# Patient Record
Sex: FEMALE | Race: WHITE | Employment: FULL TIME | URBAN - NONMETROPOLITAN AREA
[De-identification: names, ages, dates, MRNs, and addresses within clinical notes are randomized per-mention and may not be internally consistent; named-entity substitution may affect disease eponyms.]

---

## 2020-01-23 ENCOUNTER — OFFICE VISIT (OUTPATIENT)
Dept: FAMILY MEDICINE CLINIC | Age: 35
End: 2020-01-23
Payer: COMMERCIAL

## 2020-01-23 VITALS
SYSTOLIC BLOOD PRESSURE: 138 MMHG | RESPIRATION RATE: 18 BRPM | OXYGEN SATURATION: 98 % | HEIGHT: 60 IN | BODY MASS INDEX: 32.39 KG/M2 | WEIGHT: 165 LBS | HEART RATE: 107 BPM | TEMPERATURE: 98.7 F | DIASTOLIC BLOOD PRESSURE: 86 MMHG

## 2020-01-23 LAB
INFLUENZA A ANTIBODY: NORMAL
INFLUENZA B ANTIBODY: NORMAL

## 2020-01-23 PROCEDURE — 99213 OFFICE O/P EST LOW 20 MIN: CPT | Performed by: NURSE PRACTITIONER

## 2020-01-23 PROCEDURE — 87804 INFLUENZA ASSAY W/OPTIC: CPT | Performed by: NURSE PRACTITIONER

## 2020-01-23 RX ORDER — OSELTAMIVIR PHOSPHATE 75 MG/1
75 CAPSULE ORAL 2 TIMES DAILY
Qty: 10 CAPSULE | Refills: 0 | Status: SHIPPED | OUTPATIENT
Start: 2020-01-23 | End: 2020-01-28

## 2020-01-23 RX ORDER — BROMPHENIRAMINE MALEATE, PSEUDOEPHEDRINE HYDROCHLORIDE, AND DEXTROMETHORPHAN HYDROBROMIDE 2; 30; 10 MG/5ML; MG/5ML; MG/5ML
10 SYRUP ORAL 3 TIMES DAILY PRN
Qty: 200 ML | Refills: 0 | Status: SHIPPED
Start: 2020-01-23 | End: 2020-02-25 | Stop reason: ALTCHOICE

## 2020-01-23 NOTE — PROGRESS NOTES
Subjective:  Chief Complaint   Patient presents with    Fever    Generalized Body Aches       HPI:  The patient states that they have had a cough, runny nose and nasal congestion for the last 2 days. Cough is productive of sputum. The patient also reports mild sore throat without pain with swallowing/difficulty swallowing. Reports fever 102.5. Patient denies CP or dyspnea. No vomiting or diarrhea. Patient has been taking OTC medications without improvement. The patient presents for evaluation. ROS:  Positive and pertinent negatives as per HPI. All other systems are reviewed and negative. Current Outpatient Medications:     oseltamivir (TAMIFLU) 75 MG capsule, Take 1 capsule by mouth 2 times daily for 5 days, Disp: 10 capsule, Rfl: 0    brompheniramine-pseudoephedrine-DM (BROMFED DM) 2-30-10 MG/5ML syrup, Take 10 mLs by mouth 3 times daily as needed for Congestion or Cough, Disp: 200 mL, Rfl: 0   Allergies   Allergen Reactions    Penicillins     Polymyxin B-Trimethoprim         Objective:  Vitals:    01/23/20 1340   BP: 138/86   Pulse: 107   Resp: 18   Temp: 98.7 °F (37.1 °C)   TempSrc: Temporal   SpO2: 98%   Weight: 165 lb (74.8 kg)   Height: 5' (1.524 m)        Exam:  Const: Appears healthy and well developed. No signs of acute distress present. Vitals reviewed per triage. Head/Face: Normocephalic, atraumatic. Facies is symmetric. Eyes: PERRL. ENMT: Tympanic membranes are pearly gray with good light reflex bilaterally. Nares are patent with clear rhinorrhea. Buccal mucosa is moist. Mild erythema in the posterior pharynx without edema of oropharynx or petechiae of palate. Neck: Supple and symmetric. Palpation reveals no adenopathy. No meningeal signs. Trachea midline. Resp: Lungs are clear to auscultation bilaterally without wheezes, rhonchi, or crackles. Chest expansion was symmetrical without accessory muscle use noted. Cough present.   CV: S1 is normal. S2 is normal.  Musculo: Patient moves extremities without pain or limitation. Pulses are equal bilaterally. Skin: Skin is warm and dry. Neuro: Alert and oriented x3. Speech is articulate and fluent. Psych: Mood and affect are appropriate to situation. Suspicious for flu even though rapid flu was negative. I will treat with Tamiflu. She denies any chance of pregnancy. Bromfed is also given. Maintain hydration, over-the-counter antipyretics for fever. Any worsening she will need reevaluated. Hraper Kirkpatrick was seen today for fever and generalized body aches. Diagnoses and all orders for this visit:    Body aches  -     POCT Influenza A/B    Acute upper respiratory infection  -     oseltamivir (TAMIFLU) 75 MG capsule; Take 1 capsule by mouth 2 times daily for 5 days  -     brompheniramine-pseudoephedrine-DM (BROMFED DM) 2-30-10 MG/5ML syrup;  Take 10 mLs by mouth 3 times daily as needed for Congestion or Cough           Seen By:    MONICA Patel - CNP

## 2020-02-25 ENCOUNTER — OFFICE VISIT (OUTPATIENT)
Dept: FAMILY MEDICINE CLINIC | Age: 35
End: 2020-02-25
Payer: COMMERCIAL

## 2020-02-25 VITALS
DIASTOLIC BLOOD PRESSURE: 85 MMHG | HEART RATE: 104 BPM | BODY MASS INDEX: 32.98 KG/M2 | SYSTOLIC BLOOD PRESSURE: 125 MMHG | TEMPERATURE: 98.8 F | WEIGHT: 168 LBS | HEIGHT: 60 IN | OXYGEN SATURATION: 97 %

## 2020-02-25 LAB
INFLUENZA A ANTIBODY: NORMAL
INFLUENZA B ANTIBODY: NORMAL

## 2020-02-25 PROCEDURE — 99213 OFFICE O/P EST LOW 20 MIN: CPT | Performed by: NURSE PRACTITIONER

## 2020-02-25 PROCEDURE — 87804 INFLUENZA ASSAY W/OPTIC: CPT | Performed by: NURSE PRACTITIONER

## 2020-02-25 RX ORDER — OSELTAMIVIR PHOSPHATE 75 MG/1
75 CAPSULE ORAL 2 TIMES DAILY
Qty: 10 CAPSULE | Refills: 0 | Status: SHIPPED | OUTPATIENT
Start: 2020-02-25 | End: 2020-03-01

## 2020-02-25 NOTE — PROGRESS NOTES
Chief Complaint   Fever and Generalized Body Aches      History of Present Illness   Source of history provided by:  patient. Stephan Fuentes is a 38 y.o. old female who has a past medical history of: No past medical history on file. Presents to the [facility] care with complaints of a nonproductive cough, fever, body aches, chills, mild nausea, sore throat, and nasal congestion/drainage. States symptoms have been present x a couple hours. States symptoms have progressed since onset. Denies any CP, SOB, abdominal pain, vomiting, diarrhea, rash, or lethargy. Has been taking nothing for the symptoms. Denies any hx of asthma, COPD, or tobacco use. She did not receive her influenza vaccination this season. Review of Systems   Unless otherwise stated in this report or unable to obtain because of the patient's clinical or mental status as evidenced by the medical record, this patients's positive and negative responses for Review of Systems, constitutional, psych, eyes, ENT, cardiovascular, respiratory, gastrointestinal, neurological, genitourinary, musculoskeletal, integument systems and systems related to the presenting problem are either stated in the preceding or were negative for the symptoms and/or complaints related to the medical problem. Surgical History:No past surgical history on file. Social History:  reports that she has never smoked. She has never used smokeless tobacco.  Family History: family history is not on file. Allergies: Penicillins and Polymyxin b-trimethoprim    Physical Exam    VS:  /85   Pulse 104   Temp 98.8 °F (37.1 °C) (Temporal)   Ht 5' (1.524 m)   Wt 168 lb (76.2 kg)   SpO2 97%   BMI 32.81 kg/m²    Oxygen Saturation Interpretation: Normal.    Constitutional:  Alert, development consistent with age. NAD. Head:  NC/NT. Airway patent. Ears: TMs are bilaterally. Canals without exudate or swelling bilaterally.   Nose: Mild congestion of the nasal mucosa with mild drainage. Mouth: Posterior pharynx with mild erythema and clear postnasal drip. There is no tonsillar hypertrophy or exudate. Neck:  Normal ROM. Supple. There is no anterior cervical adenopathy noted. Lungs: CTAB without wheezes, rales, or rhonchi. CV:  Regular rhythm, tachycardic, normal heart sounds, without pathological murmurs, ectopy, gallops, or rubs. Skin:  Normal turgor. Warm, dry, without visible rash. Lymphatic: No lymphangitis or adenopathy noted. Neurological:  Oriented. Motor functions intact. Test Results Section   (All laboratory and radiology results have been personally reviewed by myself)  Labs:  Results for orders placed or performed in visit on 02/25/20   POCT Influenza A/B   Result Value Ref Range    Influenza A Ab neg     Influenza B Ab neg      Imaging: All Radiology results interpreted by Radiologist unless otherwise noted. No results found. Assessment Julio C Dougherty was seen today for fever and generalized body aches. Diagnoses and all orders for this visit:    Flu-like symptoms  -     oseltamivir (TAMIFLU) 75 MG capsule; Take 1 capsule by mouth 2 times daily for 5 days  - Tylenol/ibuprofen as needed for pain or fever    Body aches  -     POCT Influenza A/B    Rapid influenza test negative in office today, however clinically patient presents with flulike symptoms therefore will treat patient. Script written for Tamiflu, side effects discussed. Increase fluids and rest. NSAIDs prn body aches/fever. PCP in 3-5 days for recheck if symptoms persist. ED sooner if symptoms worsen or change. ED immediately with high or refractory fever, SOB, CP, shaking chills, vomiting, abdominal pain, etc. Pt is in agreement with this care plan. All questions answered. Return if symptoms worsen or fail to improve.     Jose Marc, APRN - CNP

## 2020-02-27 ENCOUNTER — OFFICE VISIT (OUTPATIENT)
Dept: FAMILY MEDICINE CLINIC | Age: 35
End: 2020-02-27
Payer: COMMERCIAL

## 2020-02-27 VITALS
DIASTOLIC BLOOD PRESSURE: 85 MMHG | OXYGEN SATURATION: 98 % | SYSTOLIC BLOOD PRESSURE: 125 MMHG | WEIGHT: 168 LBS | HEART RATE: 106 BPM | TEMPERATURE: 97.7 F | HEIGHT: 60 IN | BODY MASS INDEX: 32.98 KG/M2

## 2020-02-27 PROCEDURE — 99213 OFFICE O/P EST LOW 20 MIN: CPT | Performed by: PHYSICIAN ASSISTANT

## 2020-02-27 NOTE — LETTER
[facility]  [address] [place] [address]  Phone: [phone]  Fax: [phone] [place], [place]        February 27, 2020     Patient: Fernando Bright   YOB: 1985   Date of Visit: 2/27/2020       To Whom it May Concern:    Fernando Bright was seen in my clinic on 2/27/2020. She may return to work on 03/02/2020. If you have any questions or concerns, please don't hesitate to call.     Sincerely,         VERENICE Mendoza

## 2020-06-18 ENCOUNTER — APPOINTMENT (OUTPATIENT)
Dept: GENERAL RADIOLOGY | Age: 35
End: 2020-06-18
Payer: COMMERCIAL

## 2020-06-18 ENCOUNTER — HOSPITAL ENCOUNTER (EMERGENCY)
Age: 35
Discharge: HOME | End: 2020-06-18
Attending: FAMILY MEDICINE
Payer: COMMERCIAL

## 2020-06-18 VITALS
TEMPERATURE: 98.4 F | BODY MASS INDEX: 30.63 KG/M2 | HEART RATE: 68 BPM | SYSTOLIC BLOOD PRESSURE: 106 MMHG | RESPIRATION RATE: 16 BRPM | WEIGHT: 156 LBS | DIASTOLIC BLOOD PRESSURE: 70 MMHG | HEIGHT: 60 IN | OXYGEN SATURATION: 98 %

## 2020-06-18 PROCEDURE — 90715 TDAP VACCINE 7 YRS/> IM: CPT | Performed by: FAMILY MEDICINE

## 2020-06-18 PROCEDURE — 6370000000 HC RX 637 (ALT 250 FOR IP): Performed by: FAMILY MEDICINE

## 2020-06-18 PROCEDURE — 6360000002 HC RX W HCPCS: Performed by: FAMILY MEDICINE

## 2020-06-18 PROCEDURE — 99283 EMERGENCY DEPT VISIT LOW MDM: CPT

## 2020-06-18 PROCEDURE — 73564 X-RAY EXAM KNEE 4 OR MORE: CPT

## 2020-06-18 PROCEDURE — 90471 IMMUNIZATION ADMIN: CPT | Performed by: FAMILY MEDICINE

## 2020-06-18 RX ORDER — IBUPROFEN 600 MG/1
600 TABLET ORAL ONCE
Status: COMPLETED | OUTPATIENT
Start: 2020-06-18 | End: 2020-06-18

## 2020-06-18 RX ORDER — ACETAMINOPHEN 500 MG
1000 TABLET ORAL EVERY 8 HOURS PRN
Qty: 50 TABLET | Refills: 0 | Status: SHIPPED | OUTPATIENT
Start: 2020-06-18

## 2020-06-18 RX ORDER — IBUPROFEN 400 MG/1
400 TABLET ORAL EVERY 8 HOURS PRN
Qty: 12 TABLET | Refills: 0 | Status: SHIPPED | OUTPATIENT
Start: 2020-06-18

## 2020-06-18 RX ORDER — ACETAMINOPHEN 500 MG
1000 TABLET ORAL ONCE
Status: COMPLETED | OUTPATIENT
Start: 2020-06-18 | End: 2020-06-18

## 2020-06-18 RX ADMIN — TETANUS TOXOID, REDUCED DIPHTHERIA TOXOID AND ACELLULAR PERTUSSIS VACCINE, ADSORBED 0.5 ML: 5; 2.5; 8; 8; 2.5 SUSPENSION INTRAMUSCULAR at 09:37

## 2020-06-18 RX ADMIN — IBUPROFEN 600 MG: 600 TABLET, FILM COATED ORAL at 09:35

## 2020-06-18 RX ADMIN — ACETAMINOPHEN 1000 MG: 500 TABLET ORAL at 09:34

## 2020-06-18 ASSESSMENT — PAIN - FUNCTIONAL ASSESSMENT: PAIN_FUNCTIONAL_ASSESSMENT: PREVENTS OR INTERFERES SOME ACTIVE ACTIVITIES AND ADLS

## 2020-06-18 ASSESSMENT — PAIN DESCRIPTION - ORIENTATION: ORIENTATION: RIGHT

## 2020-06-18 ASSESSMENT — PAIN SCALES - WONG BAKER: WONGBAKER_NUMERICALRESPONSE: 0

## 2020-06-18 ASSESSMENT — PAIN DESCRIPTION - FREQUENCY: FREQUENCY: CONTINUOUS

## 2020-06-18 ASSESSMENT — PAIN DESCRIPTION - LOCATION: LOCATION: KNEE

## 2020-06-18 ASSESSMENT — PAIN DESCRIPTION - DESCRIPTORS: DESCRIPTORS: DISCOMFORT

## 2020-06-18 ASSESSMENT — PAIN SCALES - GENERAL
PAINLEVEL_OUTOF10: 6

## 2020-06-18 ASSESSMENT — PAIN DESCRIPTION - ONSET: ONSET: ON-GOING

## 2020-06-18 ASSESSMENT — PAIN DESCRIPTION - PAIN TYPE: TYPE: ACUTE PAIN

## 2020-07-09 ENCOUNTER — HOSPITAL ENCOUNTER (OUTPATIENT)
Dept: MRI IMAGING | Age: 35
Discharge: HOME | End: 2020-07-11
Payer: COMMERCIAL

## 2020-07-09 PROCEDURE — 73721 MRI JNT OF LWR EXTRE W/O DYE: CPT

## 2022-08-24 ENCOUNTER — OFFICE VISIT (OUTPATIENT)
Dept: PAIN MANAGEMENT | Age: 37
End: 2022-08-24
Payer: COMMERCIAL

## 2022-08-24 VITALS
TEMPERATURE: 98 F | WEIGHT: 170 LBS | DIASTOLIC BLOOD PRESSURE: 67 MMHG | SYSTOLIC BLOOD PRESSURE: 102 MMHG | RESPIRATION RATE: 16 BRPM | HEIGHT: 60 IN | HEART RATE: 71 BPM | OXYGEN SATURATION: 96 % | BODY MASS INDEX: 33.38 KG/M2

## 2022-08-24 DIAGNOSIS — S82.124A CLOSED NONDISPLACED FRACTURE OF LATERAL CONDYLE OF RIGHT TIBIA, INITIAL ENCOUNTER: Primary | ICD-10-CM

## 2022-08-24 DIAGNOSIS — S83.91XA SPRAIN OF RIGHT KNEE, UNSPECIFIED LIGAMENT, INITIAL ENCOUNTER: ICD-10-CM

## 2022-08-24 DIAGNOSIS — S83.511A SPRAIN OF ANTERIOR CRUCIATE LIGAMENT OF RIGHT KNEE, INITIAL ENCOUNTER: ICD-10-CM

## 2022-08-24 PROCEDURE — 99204 OFFICE O/P NEW MOD 45 MIN: CPT | Performed by: PAIN MEDICINE

## 2022-08-24 RX ORDER — PREGABALIN 75 MG/1
75 CAPSULE ORAL 2 TIMES DAILY
Qty: 14 CAPSULE | Refills: 0 | Status: SHIPPED | OUTPATIENT
Start: 2022-08-24 | End: 2022-08-31

## 2022-08-24 RX ORDER — PREGABALIN 150 MG/1
150 CAPSULE ORAL 2 TIMES DAILY
Qty: 60 CAPSULE | Refills: 0 | Status: SHIPPED | OUTPATIENT
Start: 2022-08-31 | End: 2022-09-30

## 2022-08-24 NOTE — PROGRESS NOTES
[facility]        [facility] [address] [place] [address] [place] [facility]  Dept: [phone]        Patient:  ROCIO Alvarez 1985    Date of Service:  22     Requesting Physician:  Sean Bautista MD    Reason for Consult:      Patient presents with complaints of right knee pain that started 2 years ago    HISTORY OF PRESENT ILLNESS:      Pain is constant and is described as aching and stabbing. Pain does not radiate to right leg. She  has numbness, tingling, weakness of the right leg and does not have bladder or bowel dysfunction. Alleviating factors include: rest.  Aggravating factors include:  movement. She has been on anticoagulation medications to include NSAIDS and has not been on herbal supplements. She is not diabetic. Imagin/2020 MRI rt knee -  Findings: There is posttraumatic bone marrow edema which is most pronounced at   the lateral tibial plateau but also has present at the lateral femoral   condyle and minimally at the medial femoral condyle and medial tibial   plateau posteriorly. Additionally, T1-weighted images at the lateral   tibial plateau demonstrate trabecular disruption compatible with an   underlying nondisplaced fracture. There is a tear of the ACL at the   femoral insertion. The PCL is intact. The medial and lateral   collateral ligaments are intact. The quadriceps and patellar tendons   are intact. The articular cartilages appear relatively preserved. There is no evidence of a meniscal tear. There is a small joint   effusion. Impression   1. Nondisplaced fracture of the lateral tibial plateau. Posttraumatic   bone marrow edema at the medial and lateral femoral condyles and   minimally at the posterior aspect of the medial tibial plateau. 2. ACL tear at the femoral insertion. 3. Small joint effusion. 2020 xray rt knee -  Findings:        Multiple views demonstrate no fracture or dislocation.  Joint spaces are maintained. No joint effusion evident. Impression       Unremarkable right knee     Previous treatments: Physical therapy, Surgery and medications. History reviewed. No pertinent past medical history. History reviewed. No pertinent surgical history. Prior to Admission medications    Medication Sig Start Date End Date Taking? Authorizing Provider   ibuprofen (IBU) 400 MG tablet Take 1 tablet by mouth every 8 hours as needed for Pain Take with full glass of water 6/18/20  Yes Marlene Brown MD   acetaminophen (TYLENOL) 500 MG tablet Take 2 tablets by mouth every 8 hours as needed for Pain 6/18/20  Yes Marlene Brown MD       Allergies   Allergen Reactions    Penicillins     Polymyxin B-Trimethoprim        Social History     Socioeconomic History    Marital status: Legally      Spouse name: Not on file    Number of children: Not on file    Years of education: Not on file    Highest education level: Not on file   Occupational History    Not on file   Tobacco Use    Smoking status: Some Days    Smokeless tobacco: Never   Substance and Sexual Activity    Alcohol use: Never    Drug use: Never    Sexual activity: Not on file   Other Topics Concern    Not on file   Social History Narrative    Not on file     Social Determinants of Health     Financial Resource Strain: Not on file   Food Insecurity: Not on file   Transportation Needs: Not on file   Physical Activity: Not on file   Stress: Not on file   Social Connections: Not on file   Intimate Partner Violence: Not on file   Housing Stability: Not on file       Family History   Problem Relation Age of Onset    Thyroid Disease Mother        REVIEW OF SYSTEMS:     Patient specifically denies fever/chills, chest pain, shortness of breath, new bowel or bladder complaints. All other review of systems was negative.     PHYSICAL EXAMINATION:      /67   Pulse 71   Temp 98 °F (36.7 °C) (Infrared)   Resp 16   Ht 5' (1.524

## 2022-08-24 NOTE — PROGRESS NOTES
Do you currently have any of the following:    Fever: No  Headache:  No  Cough: No  Shortness of breath: No  Exposed to anyone with these symptoms: No         Carlos Ramírez presents to the [facility] on [facility]/24[facility]22. Jerry Eaton is complaining of pain right leg. The pain is constant. The pain is described as aching and stabbing. Pain is rated on her best day at a 3, on her worst day at a 9, and on average at a 6 on the VAS scale. She took her last dose of Motrin and Tylenol PRN. Any procedures since your last visit: No    Pacemaker or defibrillator: No .    She is  on NSAIDS and is not on anticoagulation medications. Medication Contract and Consent for Opioid Use Documents Filed        No documents found                    /67   Pulse 71   Temp 98 °F (36.7 °C) (Infrared)   Resp 16   Ht 5' (1.524 m)   Wt 170 lb (77.1 kg)   SpO2 96%   BMI 33.20 kg/m²      No LMP recorded.

## 2022-08-31 ENCOUNTER — TELEPHONE (OUTPATIENT)
Dept: PAIN MANAGEMENT | Age: 37
End: 2022-08-31

## 2022-08-31 NOTE — TELEPHONE ENCOUNTER
Spoke to [facility] and the Pregabalin PAs were submitted for physician review as of 8-30-22 and they stated it can take a few days before a determination is made. Patient notified.

## 2022-09-09 ENCOUNTER — TELEPHONE (OUTPATIENT)
Dept: PAIN MANAGEMENT | Age: 37
End: 2022-09-09

## 2022-09-09 NOTE — TELEPHONE ENCOUNTER
I spoke to Sravan, I informed her that her  needs to appeal for the Pregabalin. She understood and stated she will talk to her .

## 2022-09-09 NOTE — TELEPHONE ENCOUNTER
PA for Pregabalin is denied by [facility]. Mindy ALLEN At the [facility] the patient's  would have to appeal this denial if you would still like it ordered.   The patient and I spoke and she stated that her  is aware that it was denied but did not state that they had a plan to appeal.

## 2022-09-27 NOTE — PROGRESS NOTES
[facility]        [address]  [facility], [address] [place] [address]  Dept: [phone]        Follow up Note      Carlos Ramírez     Date of Visit:  22     CC:  Patient presents for follow up   Chief Complaint   Patient presents with    Follow-up     Right knee pain        HPI:    Pain is unchanged. Change in quality of symptoms:no. Medication side effects:not applicable . Recent diagnostic testing:none. Recent interventional procedures:none. She has been on anticoagulation medications to include NSAIDS and has not been on herbal supplements. She is not diabetic. Imagin/2020 MRI rt knee -  Findings: There is posttraumatic bone marrow edema which is most pronounced at   the lateral tibial plateau but also has present at the lateral femoral   condyle and minimally at the medial femoral condyle and medial tibial   plateau posteriorly. Additionally, T1-weighted images at the lateral   tibial plateau demonstrate trabecular disruption compatible with an   underlying nondisplaced fracture. There is a tear of the ACL at the   femoral insertion. The PCL is intact. The medial and lateral   collateral ligaments are intact. The quadriceps and patellar tendons   are intact. The articular cartilages appear relatively preserved. There is no evidence of a meniscal tear. There is a small joint   effusion. Impression   1. Nondisplaced fracture of the lateral tibial plateau. Posttraumatic   bone marrow edema at the medial and lateral femoral condyles and   minimally at the posterior aspect of the medial tibial plateau. 2. ACL tear at the femoral insertion. 3. Small joint effusion. 2020 xray rt knee -  Findings:        Multiple views demonstrate no fracture or dislocation. Joint spaces   are maintained. No joint effusion evident.            Impression       Unremarkable right knee          Potential Aberrant Drug-Related Behavior:      Urine Drug Screenin2022 consistently negative    OARRS report:  2022 consistent    History reviewed. No pertinent past medical history. No past surgical history on file. Prior to Admission medications    Medication Sig Start Date End Date Taking? Authorizing Provider   ibuprofen (IBU) 400 MG tablet Take 1 tablet by mouth every 8 hours as needed for Pain Take with full glass of water 20  Yes Debra Espinoza MD   pregabalin (LYRICA) 150 MG capsule Take 1 capsule by mouth 2 times daily for 30 days. Bill through [employer]  Patient not taking: Reported on 2022  Moi Emanuel DO   pregabalin (LYRICA) 75 MG capsule Take 1 capsule by mouth 2 times daily for 7 days.  Bill through [employer]  Patient not taking: Reported on 2022  Moi Emanuel DO   acetaminophen (TYLENOL) 500 MG tablet Take 2 tablets by mouth every 8 hours as needed for Pain  Patient not taking: Reported on 2022   Debra Espinoza MD       Allergies   Allergen Reactions    Penicillins     Polymyxin B-Trimethoprim        Social History     Socioeconomic History    Marital status: Legally      Spouse name: Not on file    Number of children: Not on file    Years of education: Not on file    Highest education level: Not on file   Occupational History    Not on file   Tobacco Use    Smoking status: Some Days    Smokeless tobacco: Never   Substance and Sexual Activity    Alcohol use: Never    Drug use: Never    Sexual activity: Not on file   Other Topics Concern    Not on file   Social History Narrative    Not on file     Social Determinants of Health     Financial Resource Strain: Not on file   Food Insecurity: Not on file   Transportation Needs: Not on file   Physical Activity: Not on file   Stress: Not on file   Social Connections: Not on file   Intimate Partner Violence: Not on file   Housing Stability: Not on file       Family History   Problem Relation Age of Onset    Thyroid Disease Mother        REVIEW OF SYSTEMS:     Silvia Hsu denies fever/chills, chest pain, shortness of breath, new bowel or bladder complaints. All other review of systems was negative. PHYSICAL EXAMINATION:      /75   Pulse 87   Temp 98.1 °F (36.7 °C) (Infrared)   Resp 16   Ht 5' (1.524 m)   Wt 170 lb (77.1 kg)   SpO2 96%   BMI 33.20 kg/m²     General:       General appearance:pleasant and well-hydrated, in no distress and A & O x3  Build:Overweight  Function:Rises from a seated position easily. HEENT:     Head:normocephalic, atraumatic  Pupils:regular, round, equal  Sclera: icterus absent     Lungs:     Breathing:normal breathing pattern     Abdomen:     Shape:non-distended  Tenderness:none  Guarding:none     Lumbar spine:     Spine inspection:normal   CVA tenderness:No   Palpation:tenderness paravertebral muscles, left, right negative. Range of motion:normal in lateral bending, flexion, extension rotation bilateral and is painful. Musculoskeletal:     Trigger points in Paraveteral:absent bilaterally  SI joint tenderness:negative right, negative left              RAGHU test:not done right, not done left  Piriformis tenderness:negative right, negative left  Trochanteric bursa tenderness:negative right, negative left  SLR:negative right, negative left, sitting      Extremities:     Tremors:None bilaterally upper and lower  Range of motion:pain with internal rotation of hips negative. Intact:Yes  Varicose veins:absent   Pulses:present Lt radial  Cyanosis:none  Edema:none x all 4 extremities     Knee:      Inspection:asymmetric, swelling none bilaterally  Tenderness of Bony Landmarks:Lateral and Medial, right  Effusion:absent bilaterally  Crepitus:absent bilaterally  ROM:Left full  Right full     Neurological:     Sensory:normal to light touch BLE escept some decreased sensation in the post-surgical region  Motor:                Right Quadriceps5/5          Left Quadriceps5/5           Right Gastrocnemius5/5    Left Gastrocnemius5/5  Right Ant Tibialis5/5  Left Ant Tibialis5/5     Reflexes:  (not assessed today)  Right Quadriceps reflex2+  Left Quadriceps reflex2+  Right Achilles reflex2+  Left Achilles reflex2+  Gait:normal station     Dermatology:     Skin:no rashes or lesions noted     Impression:     Chronic rt knee pain s/p rt knee work-related injury DOI 6/18/2020 where she was wrapping a pallet with lateral force and incurred the knee injury  Diagnosed with rt ACL tear at the femoral insertion as well as nondisplaced fracture of lateral tibial plateau and treated surgically with Dr. Ibrahim Score 12/20/2020  + N/T of the anterior lower leg, + weakness at times and also at times locks up  PMHx: none  Has not worked since the injury, was working [employer]     She has had updated imaging since surgery but I do not have access to that     Plan:     I recommend a second opinion from an orthopedic surgeon, pt will request this from her POR  Urine screen reviewed  OARRS report reviewed  Pregabalin titration to treat neuropathic component of pain - currently denied through [facility] and is being appealed  Patient encouraged to stay active and to lose weight  Treatment plan discussed with the patient including medication and procedure side effects    Cc:  Referring physician    MARIO Brito.

## 2022-09-28 ENCOUNTER — OFFICE VISIT (OUTPATIENT)
Dept: PAIN MANAGEMENT | Age: 37
End: 2022-09-28
Payer: COMMERCIAL

## 2022-09-28 VITALS
SYSTOLIC BLOOD PRESSURE: 113 MMHG | RESPIRATION RATE: 16 BRPM | OXYGEN SATURATION: 96 % | TEMPERATURE: 98.1 F | HEIGHT: 60 IN | BODY MASS INDEX: 33.38 KG/M2 | DIASTOLIC BLOOD PRESSURE: 75 MMHG | WEIGHT: 170 LBS | HEART RATE: 87 BPM

## 2022-09-28 DIAGNOSIS — S82.124A CLOSED NONDISPLACED FRACTURE OF LATERAL CONDYLE OF RIGHT TIBIA, INITIAL ENCOUNTER: Primary | ICD-10-CM

## 2022-09-28 DIAGNOSIS — S83.511A SPRAIN OF ANTERIOR CRUCIATE LIGAMENT OF RIGHT KNEE, INITIAL ENCOUNTER: ICD-10-CM

## 2022-09-28 DIAGNOSIS — S83.91XA SPRAIN OF RIGHT KNEE, UNSPECIFIED LIGAMENT, INITIAL ENCOUNTER: ICD-10-CM

## 2022-09-28 PROCEDURE — 99213 OFFICE O/P EST LOW 20 MIN: CPT | Performed by: PAIN MEDICINE

## 2022-09-28 PROCEDURE — 99214 OFFICE O/P EST MOD 30 MIN: CPT | Performed by: PAIN MEDICINE

## 2022-11-23 ENCOUNTER — OFFICE VISIT (OUTPATIENT)
Dept: ORTHOPEDIC SURGERY | Age: 37
End: 2022-11-23
Payer: COMMERCIAL

## 2022-11-23 VITALS — HEIGHT: 60 IN | BODY MASS INDEX: 33.38 KG/M2 | WEIGHT: 170 LBS

## 2022-11-23 DIAGNOSIS — T84.89XA TEAR OF ANTERIOR CRUCIATE LIGAMENT GRAFT, INITIAL ENCOUNTER (HCC): ICD-10-CM

## 2022-11-23 DIAGNOSIS — S82.124A CLOSED NONDISPLACED FRACTURE OF LATERAL CONDYLE OF RIGHT TIBIA, INITIAL ENCOUNTER: Primary | ICD-10-CM

## 2022-11-23 PROCEDURE — 99204 OFFICE O/P NEW MOD 45 MIN: CPT | Performed by: ORTHOPAEDIC SURGERY

## 2022-11-23 NOTE — PROGRESS NOTES
New Knee Patient     Referring Provider:   Herb Goodpasture, MD  [address] [place] [address]    CHIEF COMPLAINT:   Chief Complaint   Patient presents with    Work Related Injury     6/18/2020 - she states ACL reconstructed 12/30/2022 with [facility] - Dr Manolo Lazcano - she states that the knee pain has not gotten better and bothersome with ADLs, very uncomfortable     Results     Imaging in Epic from prior to surgery - 2020 - newer imaging done at [facility] but do not have disc    Other     C/o nerve / tingling over the shin    Second opinion for [facility] - she is seeing pain management as well         HPI:    Clyed Ballard is a 42y.o. year old female who is seen today  for evaluation of right knee pain. She reports the pain has been ongoing for the past {NUMBERS 1-10:12305} {days/wks/mos/yrs:702562}. She {DOES:19736} recall a specific injury which started the pain.  ***. She reports the pain is worse with ***, better with ***. The patient {DOES/DOES NOT:84385} have mechanical symptoms. She  denies a feeling of instability. The prior treatments have been {prev rx:374262}. The patient {HAS/HAS NOT:303281616} responded to the treatment. The patient is not working, she states that was let go 12/2021, has restriction thru her [facility] provider. ***    PAST MEDICAL HISTORY  No past medical history on file. PAST SURGICAL HISTORY  No past surgical history on file.       FAMILY HISTORY   Family History   Problem Relation Age of Onset    Thyroid Disease Mother        SOCIAL HISTORY  Social History     Socioeconomic History    Marital status: Legally      Spouse name: Not on file    Number of children: Not on file    Years of education: Not on file    Highest education level: Not on file   Occupational History    Not on file   Tobacco Use    Smoking status: Some Days    Smokeless tobacco: Never   Substance and Sexual Activity    Alcohol use: Never    Drug use: Never    Sexual activity: Not on file   Other Topics Concern    Not on file Social History Narrative    Not on file     Social Determinants of Health     Financial Resource Strain: Not on file   Food Insecurity: Not on file   Transportation Needs: Not on file   Physical Activity: Not on file   Stress: Not on file   Social Connections: Not on file   Intimate Partner Violence: Not on file   Housing Stability: Not on file     Social History     Occupational History    Not on file   Tobacco Use    Smoking status: Some Days    Smokeless tobacco: Never   Substance and Sexual Activity    Alcohol use: Never    Drug use: Never    Sexual activity: Not on file       CURRENT MEDICATIONS     Current Outpatient Medications:     ibuprofen (IBU) 400 MG tablet, Take 1 tablet by mouth every 8 hours as needed for Pain Take with full glass of water, Disp: 12 tablet, Rfl: 0    pregabalin (LYRICA) 150 MG capsule, Take 1 capsule by mouth 2 times daily for 30 days. Bill through [facility] (Patient not taking: Reported on 9/28/2022), Disp: 60 capsule, Rfl: 0    pregabalin (LYRICA) 75 MG capsule, Take 1 capsule by mouth 2 times daily for 7 days.  Bill through [facility] (Patient not taking: Reported on 9/28/2022), Disp: 14 capsule, Rfl: 0    acetaminophen (TYLENOL) 500 MG tablet, Take 2 tablets by mouth every 8 hours as needed for Pain (Patient not taking: Reported on 9/28/2022), Disp: 50 tablet, Rfl: 0    ALLERGIES  Allergies   Allergen Reactions    Penicillins     Polymyxin B-Trimethoprim        Controlled Substances Monitoring:          REVIEW OF SYSTEMS:     Constitutional:  Negative for weight loss, fevers, chills, fatigue  Cardiovascular: Negative for chest pain, palpitations  Pulmonary: Negative for shortness of breath, labored breathing, cough  GI: negative for abdominal pain, nausea, vomitting   MSK: per HPI  Skin: negative for rash, open wounds    All other systems reviewed and are negative         PHYSICAL EXAM     Vitals:    11/23/22 1005   Weight: 170 lb (77.1 kg)   Height: 5' (1.524 m)       Height: 5' (1.524 m) Weight: 170 lb per pt BMI:  Body mass index is 33.2 kg/m². General: The patient is alert and oriented x 3, appears to be stated age and in no distress. HEENT: head is normocephalic, atraumatic. EOMI. Neck: supple, trachea midline, no thyromegaly   Cardiovascular: peripheral pulses palpable. Normal Capillary refill   Respiratory: breathing unlabored, chest expansion symmetric   Skin: no rash, no open wounds, no erythema  Psych: normal affect; mood stable  Neurologic: gait normal, sensation grossly intact in extremities  MSK:        Lower Extremity:   Ipsilateral hip exam shows normal range of motion without pain with impingement testing.       ***            IMAGING:    XR: 4 views of the right bilateral knee show ***        ASSESSMENT  Right knee ***    PLAN  ***        Quinton Sun MD  Orthopaedic Surgery   11/23/22  10:08 AM

## 2022-11-23 NOTE — PATIENT INSTRUCTIONS
MRI order was placed today, please call the office once you are scheduled for your MRI for a review with the physician. If you do not hear from radiology scheduling within 1 week, please call [phone] and press option #2.

## 2022-11-23 NOTE — PROGRESS NOTES
New Knee Patient     Referring Provider:   Marta Joaquin MD  [address] [place] [address]    CHIEF COMPLAINT:   Chief Complaint   Patient presents with    Work Related Injury     6/18/2020 - she states ACL reconstructed 12/30/2022 with [facility] - Dr Julian Arriaga - she states that the knee pain has not gotten better and bothersome with ADLs, very uncomfortable     Results     Imaging in Epic from prior to surgery - 2020 - newer imaging done at [facility] but do not have disc    Other     C/o nerve / tingling over the shin    Second opinion for [facility] - she is seeing pain management as well         HPI:    Margarita Broussard is a 42y.o. year old female here today with complaints of right knee pain. In July 2020, the patient had a work-related injury to the right knee resulting in a rupture of her ACL which was subsequently reconstructed by an orthopedic surgeon. She states that ever since the surgery, her right knee pain has not significantly improved. She also states that she gets occasional clicking and popping and sometimes her knee will feel like it locks up or gives out on her. She also has complaints of some paresthesias in the anterior leg, chronic numbness and tingling which she has had since the surgery and she has seen pain management in the past.  Denies any new injuries, denies any redness or swelling, denies any fevers or chills. PAST MEDICAL HISTORY  No past medical history on file. PAST SURGICAL HISTORY  No past surgical history on file.       FAMILY HISTORY   Family History   Problem Relation Age of Onset    Thyroid Disease Mother        SOCIAL HISTORY  Social History     Socioeconomic History    Marital status: Legally      Spouse name: Not on file    Number of children: Not on file    Years of education: Not on file    Highest education level: Not on file   Occupational History    Not on file   Tobacco Use    Smoking status: Some Days    Smokeless tobacco: Never   Substance and Sexual Activity Alcohol use: Never    Drug use: Never    Sexual activity: Not on file   Other Topics Concern    Not on file   Social History Narrative    Not on file     Social Determinants of Health     Financial Resource Strain: Not on file   Food Insecurity: Not on file   Transportation Needs: Not on file   Physical Activity: Not on file   Stress: Not on file   Social Connections: Not on file   Intimate Partner Violence: Not on file   Housing Stability: Not on file     Social History     Occupational History    Not on file   Tobacco Use    Smoking status: Some Days    Smokeless tobacco: Never   Substance and Sexual Activity    Alcohol use: Never    Drug use: Never    Sexual activity: Not on file       CURRENT MEDICATIONS     Current Outpatient Medications:     ibuprofen (IBU) 400 MG tablet, Take 1 tablet by mouth every 8 hours as needed for Pain Take with full glass of water, Disp: 12 tablet, Rfl: 0    pregabalin (LYRICA) 150 MG capsule, Take 1 capsule by mouth 2 times daily for 30 days. Bill through [address] [place] [address] (Patient not taking: Reported on 9/28/2022), Disp: 60 capsule, Rfl: 0    pregabalin (LYRICA) 75 MG capsule, Take 1 capsule by mouth 2 times daily for 7 days.  Bill through [address] [place] [address] (Patient not taking: Reported on 9/28/2022), Disp: 14 capsule, Rfl: 0    acetaminophen (TYLENOL) 500 MG tablet, Take 2 tablets by mouth every 8 hours as needed for Pain (Patient not taking: Reported on 9/28/2022), Disp: 50 tablet, Rfl: 0    ALLERGIES  Allergies   Allergen Reactions    Penicillins     Polymyxin B-Trimethoprim        Controlled Substances Monitoring:          REVIEW OF SYSTEMS:     Constitutional:  Negative for weight loss, fevers, chills, fatigue  Cardiovascular: Negative for chest pain, palpitations  Pulmonary: Negative for shortness of breath, labored breathing, cough  GI: negative for abdominal pain, nausea, vomitting   MSK: per HPI  Skin: negative for rash, open wounds    All other systems reviewed and are negative         PHYSICAL EXAM Vitals:    11/23/22 1005   Weight: 170 lb (77.1 kg)   Height: 5' (1.524 m)       Height: 5' (1.524 m)   BMI:  Body mass index is 33.2 kg/m². General: The patient is alert and oriented x 3, appears to be stated age and in no distress. HEENT: head is normocephalic, atraumatic. EOMI. Neck: supple, trachea midline, no thyromegaly   Cardiovascular: peripheral pulses palpable. Normal Capillary refill   Respiratory: breathing unlabored, chest expansion symmetric   Skin: no rash, no open wounds, no erythema  Psych: normal affect; mood stable  Neurologic: gait normal, sensation grossly intact in extremities  MSK:        Lower Extremity:   Ipsilateral hip exam shows normal range of motion without pain with impingement testing. Right knee: Surgical scars over the anterior and lateral knee are well-healed. No significant erythema, no significant joint effusion. 2B Lachman. Pivot shift mildly positive. No significant varus or valgus laxity. Ileana's test does elicit pain and reproduce some of her symptoms, however no palpable click, no locking. Thessaly's test positive on the right. There is joint line tenderness both medially and laterally. IMAGING:    XR: 4 views of the right knee with bilateral weightbearing films reviewed. Status post ACL reconstruction, tunnels appear to be appropriately positioned. button seen over the lateral femur in good contact with the cortex. Joint space narrowing in the medial and lateral compartments, medial >lateral.  No fractures or dislocations noted        ASSESSMENT  Status post right ACL reconstruction  Right ACL Graft failure     PLAN  Concern for retear of right ACL. MRI of right knee ordered  RTC once MRI done to go over results  WBAT RLE, avoidance of painful activities    [facility], DO  11/23/22      Staff Addendum    I have seen and evaluated the patient and agree with the assessment and plan as documented by the orthopaedic surgery resident.  I have performed the león components of the history and physical examination and concur with the findings and plan, and have made changes where appropriate/necessary. Agree with above. Her exam today is consistent with ACL graft insufficiency. She has a positive Lachman as well as a pivot glide. She states she has never felt quite back to normal after surgery. She has not had any recent reinjury. At this point I would like to get a new MRI to assess her graft as well as for other causes of internal derangement which could be contributing to her persistent pain and instability. She is in agreement with this plan. This will have to be approved through [facility]. We will see her back once MRI is complete to discuss results and determine treatment moving forward.       Norris Noyola MD  [facility]

## 2022-12-22 ENCOUNTER — HOSPITAL ENCOUNTER (OUTPATIENT)
Dept: MRI IMAGING | Age: 37
Discharge: HOME | End: 2022-12-24
Payer: COMMERCIAL

## 2022-12-22 DIAGNOSIS — T84.89XA TEAR OF ANTERIOR CRUCIATE LIGAMENT GRAFT, INITIAL ENCOUNTER (HCC): ICD-10-CM

## 2022-12-22 PROCEDURE — 73721 MRI JNT OF LWR EXTRE W/O DYE: CPT

## 2023-01-03 ENCOUNTER — TELEPHONE (OUTPATIENT)
Dept: ORTHOPEDIC SURGERY | Age: 38
End: 2023-01-03

## 2023-01-03 NOTE — TELEPHONE ENCOUNTER
----- Message from Germán Muir MD sent at 12/23/2022 10:45 AM EST -----  I called and left a voicemail for [facility] regarding her MRI results. I instructed her to call the office to schedule a follow-up appointment in the next 2 to 3 weeks once I am back in the office.

## 2023-01-18 ENCOUNTER — OFFICE VISIT (OUTPATIENT)
Dept: ORTHOPEDIC SURGERY | Age: 38
End: 2023-01-18
Payer: COMMERCIAL

## 2023-01-18 DIAGNOSIS — T84.89XA TEAR OF ANTERIOR CRUCIATE LIGAMENT GRAFT, INITIAL ENCOUNTER (HCC): Primary | ICD-10-CM

## 2023-01-18 PROCEDURE — 99213 OFFICE O/P EST LOW 20 MIN: CPT | Performed by: ORTHOPAEDIC SURGERY

## 2023-01-18 NOTE — PROGRESS NOTES
Follow Up Visit     Karyle Bonus returns today for follow up visit regarding her right knee. She is status post ACL reconstruction by Dr. Pablo Robert about 3 years ago. She is here for follow-up of her MRI. She continues to have mainly pain across the front of her knee which is almost more nerve type pain with burning and tingling going down the leg. She has not had any recent instability    REVIEW OF SYSTEMS:     Constitutional:  Negative for weight loss, fevers, chills, fatigue  Cardiovascular: Negative for chest pain, palpitations  Pulmonary: Negative for shortness of breath, labored breathing, cough  GI: negative for abdominal pain, nausea, vomitting   MSK: per HPI  Skin: negative for rash, open wounds    All other systems reviewed and are negative       Physical Exam:     No data recorded    General: Alert and oriented x3, no acute distress  Cardiovascular/pulmonary: No labored breathing, peripheral perfusion intact  Musculoskeletal:    On exam, incisions are healed. Lachman test is stable. Pivot shift is negative. Range of motion is full. There is some tenderness just over the patella even with touching of her skin. Patella tracks midline. There is no crepitus    Controlled Substances Monitoring:      Imaging:  MRI reviewed showing intact ACL graft, slightly anterior placement of the femoral tunnel. Graft appears mature. No other obvious abnormality on MRI      Assessment: Status post ACL reconstruction with allograft about 3 years ago, with persistent pain, concern for complex regional pain syndrome      Plan:   We discussed her knee today. We went over her MRI findings that show an intact graft. She is not having instability in her knee. She is mainly having paresthesias and pain in the front of the knee. I cannot explain these with any of her imaging findings or exam today. My concern would be that she has developed some type of complex regional pain syndrome.   I think she would benefit from treatment for this including medication such as gabapentin or Lyrica. She can follow-up with us in 3 months. I do not anticipate any need for revision surgery.     Keisha Hernandes MD  Orthopaedic Surgery   1/18/23  9:13 AM

## 2023-03-16 ENCOUNTER — OFFICE VISIT (OUTPATIENT)
Dept: PAIN MANAGEMENT | Age: 38
End: 2023-03-16
Payer: COMMERCIAL

## 2023-03-16 VITALS
OXYGEN SATURATION: 97 % | HEART RATE: 64 BPM | BODY MASS INDEX: 33.38 KG/M2 | RESPIRATION RATE: 16 BRPM | HEIGHT: 60 IN | WEIGHT: 170 LBS | SYSTOLIC BLOOD PRESSURE: 105 MMHG | DIASTOLIC BLOOD PRESSURE: 65 MMHG | TEMPERATURE: 97.6 F

## 2023-03-16 DIAGNOSIS — S82.124A CLOSED NONDISPLACED FRACTURE OF LATERAL CONDYLE OF RIGHT TIBIA, INITIAL ENCOUNTER: ICD-10-CM

## 2023-03-16 DIAGNOSIS — S83.511A SPRAIN OF ANTERIOR CRUCIATE LIGAMENT OF RIGHT KNEE, INITIAL ENCOUNTER: ICD-10-CM

## 2023-03-16 DIAGNOSIS — S83.91XA SPRAIN OF RIGHT KNEE, UNSPECIFIED LIGAMENT, INITIAL ENCOUNTER: Primary | ICD-10-CM

## 2023-03-16 PROCEDURE — 99213 OFFICE O/P EST LOW 20 MIN: CPT | Performed by: PAIN MEDICINE

## 2023-03-16 RX ORDER — PREGABALIN 150 MG/1
150 CAPSULE ORAL 2 TIMES DAILY
Qty: 60 CAPSULE | Refills: 2 | Status: SHIPPED | OUTPATIENT
Start: 2023-03-16 | End: 2023-04-15

## 2023-03-16 NOTE — PROGRESS NOTES
Do you currently have any of the following:    Fever: No  Headache:  No  Cough: No  Shortness of breath: No  Exposed to anyone with these symptoms: No         Madison Bobby presents to the [address] [place] [address] on 3/16/2023. Marc French is complaining of pain right knee. The pain is constant. The pain is described as aching. Pain is rated on her best day at a 4, on her worst day at a 9, and on average at a 6 on the VAS scale. She took her last dose of  Advil   PRN. Any procedures since your last visit: No    Pacemaker or defibrillator: No  She is  on NSAIDS and is not on anticoagulation medication. Medication Contract and Consent for Opioid Use Documents Filed        No documents found                    There were no vitals taken for this visit. No LMP recorded. (Menstrual status: Other - See Notes).

## 2023-03-16 NOTE — PROGRESS NOTES
[facility]        [address]  [facility], [address] [facility]  Dept: [phone]        Follow up Note      Zahra Fernandez     Date of Visit:  23     CC:  Patient presents for follow up   Chief Complaint   Patient presents with    Follow-up    Knee Pain     Right knee     HPI:    Pain is unchanged. Change in quality of symptoms:no. Medication side effects:not applicable . Recent diagnostic testing:none. Recent interventional procedures:none. She has been on anticoagulation medications to include NSAIDS and has not been on herbal supplements. She is not diabetic. Imagin/2020 MRI rt knee -  Findings: There is posttraumatic bone marrow edema which is most pronounced at   the lateral tibial plateau but also has present at the lateral femoral   condyle and minimally at the medial femoral condyle and medial tibial   plateau posteriorly. Additionally, T1-weighted images at the lateral   tibial plateau demonstrate trabecular disruption compatible with an   underlying nondisplaced fracture. There is a tear of the ACL at the   femoral insertion. The PCL is intact. The medial and lateral   collateral ligaments are intact. The quadriceps and patellar tendons   are intact. The articular cartilages appear relatively preserved. There is no evidence of a meniscal tear. There is a small joint   effusion. Impression   1. Nondisplaced fracture of the lateral tibial plateau. Posttraumatic   bone marrow edema at the medial and lateral femoral condyles and   minimally at the posterior aspect of the medial tibial plateau. 2. ACL tear at the femoral insertion. 3. Small joint effusion. 2020 xray rt knee -  Findings:        Multiple views demonstrate no fracture or dislocation. Joint spaces   are maintained. No joint effusion evident.            Impression       Unremarkable right knee          Potential Aberrant Drug-Related Behavior: Urine Drug Screenin2022 consistently negative    OARRS report:  2022 consistent    History reviewed. No pertinent past medical history. History reviewed. No pertinent surgical history. Prior to Admission medications    Medication Sig Start Date End Date Taking? Authorizing Provider   ibuprofen (IBU) 400 MG tablet Take 1 tablet by mouth every 8 hours as needed for Pain Take with full glass of water 20  Yes Danielle Alejandre MD   acetaminophen (TYLENOL) 500 MG tablet Take 2 tablets by mouth every 8 hours as needed for Pain 20  Yes Danielle Alejandre MD   pregabalin (LYRICA) 150 MG capsule Take 1 capsule by mouth 2 times daily for 30 days. Bill through [employer]  Patient not taking: Reported on 2022  Kala Aguilar DO   pregabalin (LYRICA) 75 MG capsule Take 1 capsule by mouth 2 times daily for 7 days.  Bill through [employer]  Patient not taking: Reported on 2022  Kala Aguilar DO       Allergies   Allergen Reactions    Penicillins     Polymyxin B-Trimethoprim        Social History     Socioeconomic History    Marital status: Legally      Spouse name: Not on file    Number of children: Not on file    Years of education: Not on file    Highest education level: Not on file   Occupational History    Not on file   Tobacco Use    Smoking status: Some Days    Smokeless tobacco: Never   Substance and Sexual Activity    Alcohol use: Never    Drug use: Never    Sexual activity: Not on file   Other Topics Concern    Not on file   Social History Narrative    Not on file     Social Determinants of Health     Financial Resource Strain: Not on file   Food Insecurity: Not on file   Transportation Needs: Not on file   Physical Activity: Not on file   Stress: Not on file   Social Connections: Not on file   Intimate Partner Violence: Not on file   Housing Stability: Not on file       Family History   Problem Relation Age of Onset    Thyroid Disease Mother REVIEW OF SYSTEMS:     Catrina Salomon denies fever/chills, chest pain, shortness of breath, new bowel or bladder complaints. All other review of systems was negative. PHYSICAL EXAMINATION:      /65   Pulse 64   Temp 97.6 °F (36.4 °C) (Infrared)   Resp 16   Ht 5' (1.524 m)   Wt 170 lb (77.1 kg)   SpO2 97%   BMI 33.20 kg/m²     General:       General appearance:pleasant and well-hydrated, in no distress and A & O x3  Build:Overweight  Function:Rises from a seated position easily. HEENT:     Head:normocephalic, atraumatic  Pupils:regular, round, equal  Sclera: icterus absent     Lungs:     Breathing:normal breathing pattern     Abdomen:     Shape:non-distended  Tenderness:none  Guarding:none     Lumbar spine:     Spine inspection:normal   CVA tenderness:No   Palpation:tenderness paravertebral muscles, left, right negative. Range of motion:normal in lateral bending, flexion, extension rotation bilateral and is painful. Musculoskeletal:     Trigger points in Paraveteral:absent bilaterally  SI joint tenderness:negative right, negative left              RAGHU test:not done right, not done left  Piriformis tenderness:negative right, negative left  Trochanteric bursa tenderness:negative right, negative left  SLR:negative right, negative left, sitting      Extremities:     Tremors:None bilaterally upper and lower  Range of motion:pain with internal rotation of hips negative. Intact:Yes  Varicose veins:absent   Pulses:present Lt radial  Cyanosis:none  Edema:none x all 4 extremities     Knee:      Inspection:asymmetric, swelling none bilaterally  Tenderness of Bony Landmarks:Lateral and Medial, right  Effusion:absent bilaterally  Crepitus:absent bilaterally  ROM:Left full  Right full     Neurological:     Sensory:normal to light touch BLE escept some decreased sensation in the post-surgical region  Motor:                Right Quadriceps5/5          Left Quadriceps5/5           Right Gastrocnemius5/5 Left Gastrocnemius5/5  Right Ant Tibialis5/5  Left Ant Tibialis5/5     Reflexes:  (not assessed today)  Right Quadriceps reflex2+  Left Quadriceps reflex2+  Right Achilles reflex2+  Left Achilles reflex2+  Gait:normal station     Dermatology:     Skin:no rashes or lesions noted     Impression:     Chronic rt knee pain s/p rt knee work-related injury DOI 6/18/2020 where she was wrapping a pallet with lateral force and incurred the knee injury  Diagnosed with rt ACL tear at the femoral insertion as well as nondisplaced fracture of lateral tibial plateau and treated surgically with Dr. Jazmin Stallworth 12/20/2020  + N/T of the anterior lower leg, + weakness at times and also at times locks up  Consistent with CRPS  PMHx: none  Has not worked since the injury, was working [employer]    The pregabalin was filled through [employer] in November and she cannot recall if it was helpful  She would like to retry this medication      Plan:     Had second opinion with Dr. Felipe Acosta, note reviewed  Urine screen reviewed  OARRS report reviewed  Restart pregabalin 150 mg BID, she will start with QD for the first 4 days  Completed PT - continue with HEP  Patient encouraged to stay active and to lose weight  Treatment plan discussed with the patient including medication and procedure side effects    Cc:  Referring physician    M. Clide Kayser.

## 2023-04-19 ENCOUNTER — OFFICE VISIT (OUTPATIENT)
Dept: ORTHOPEDIC SURGERY | Age: 38
End: 2023-04-19

## 2023-04-19 VITALS — BODY MASS INDEX: 33.38 KG/M2 | WEIGHT: 170 LBS | HEIGHT: 60 IN

## 2023-04-19 DIAGNOSIS — G89.28 CHRONIC POSTOPERATIVE PAIN: Primary | ICD-10-CM

## 2023-04-19 DIAGNOSIS — T84.89XA TEAR OF ANTERIOR CRUCIATE LIGAMENT GRAFT, INITIAL ENCOUNTER (HCC): ICD-10-CM

## 2023-04-19 RX ORDER — IBUPROFEN 200 MG
400 TABLET ORAL EVERY 6 HOURS PRN
COMMUNITY

## 2023-04-19 NOTE — PROGRESS NOTES
[facility]   ORTHOPAEDIC SURGERY AND SPORTS MEDICINE  DATE OF VISIT: 04/19/23  Follow Up Visit     CHIEF COMPLAINT:   Chief Complaint   Patient presents with    Follow-up     Status post ACL reconstruction with allograft about 3 years ago, with persistent pain, concern for complex regional pain syndrome      Pain     Today 4 / 10       HPI:    Jerome Olguin is a 34y.o. year old female who presented to the office today for follow up of Status post right ACL reconstruction with allograft about 3 years ago by Dr. Loyce Schwab, with persistent pain, concern for complex regional pain syndrome, previously evaluated on 1/18/2023. Previous treatment has included MRI, pregabalin prescribed by Dr. Villa Barr. She reports symptoms are improved, unchanged. The patient has not responded to the treatment. REVIEW OF SYSTEMS:     Constitutional:  Negative for weight loss, fevers, chills, fatigue  Cardiovascular: Negative for chest pain, palpitations  Pulmonary: Negative for shortness of breath, labored breathing, cough  GI: negative for abdominal pain, nausea, vomiting   MSK: per HPI  Skin: negative for rash, open wounds    All other systems reviewed and are negative       Physical Exam:     Height: 5' (1.524 m), Weight: 170 lb (77.1 kg) (per pt)    General: Alert and oriented x3, no acute distress  Cardiovascular/pulmonary: No labored breathing, peripheral perfusion intact  Musculoskeletal:    Right knee exam right knee exam negative swelling, effusion. Range of motion full 0-140. Valgus/varus stress intact. Intact posterior drawer and Lachman test.  There is tenderness to the anterior knee that extends down below into the shin. Skin was warm dry and intact. Knee was grossly stable on exam.    Controlled Substances Monitoring:      Imaging:  MRI reviewed showing intact ACL graft, remainder of knee appears unremarkable      Assessment: Status post ACL reconstruction with allograft about 3 years ago      Plan:    Today we discussed

## 2023-06-06 ENCOUNTER — TELEPHONE (OUTPATIENT)
Dept: PAIN MANAGEMENT | Age: 38
End: 2023-06-06

## 2023-06-06 NOTE — TELEPHONE ENCOUNTER
Irene Morales called to cancel her appointment for today due to lack of transportation. She asked we let Dr. Zohreh Manzo know that the Lyrica is not helping with her pain. She stated all it really does is make her tired and unable to focus. Her next appointment is rescheduled for 7/5/2023. Raine Fears

## 2023-06-08 NOTE — TELEPHONE ENCOUNTER
Recommend she discontinue the pregabalin. We can discuss further options in her upcoming appt. If she wants to come in sooner she may be able to get on Barbara's schedule, it's up to her and Barbara's availability.

## 2023-07-05 ENCOUNTER — OFFICE VISIT (OUTPATIENT)
Dept: PAIN MANAGEMENT | Age: 38
End: 2023-07-05
Payer: COMMERCIAL

## 2023-07-05 VITALS
TEMPERATURE: 97.4 F | HEART RATE: 73 BPM | BODY MASS INDEX: 33.38 KG/M2 | WEIGHT: 170 LBS | DIASTOLIC BLOOD PRESSURE: 75 MMHG | SYSTOLIC BLOOD PRESSURE: 116 MMHG | RESPIRATION RATE: 16 BRPM | HEIGHT: 60 IN | OXYGEN SATURATION: 94 %

## 2023-07-05 DIAGNOSIS — G89.4 CHRONIC PAIN SYNDROME: Primary | ICD-10-CM

## 2023-07-05 DIAGNOSIS — S82.124A CLOSED NONDISPLACED FRACTURE OF LATERAL CONDYLE OF RIGHT TIBIA, INITIAL ENCOUNTER: ICD-10-CM

## 2023-07-05 DIAGNOSIS — S83.91XA SPRAIN OF RIGHT KNEE, UNSPECIFIED LIGAMENT, INITIAL ENCOUNTER: ICD-10-CM

## 2023-07-05 DIAGNOSIS — S83.511A SPRAIN OF ANTERIOR CRUCIATE LIGAMENT OF RIGHT KNEE, INITIAL ENCOUNTER: ICD-10-CM

## 2023-07-05 PROCEDURE — 99213 OFFICE O/P EST LOW 20 MIN: CPT | Performed by: PAIN MEDICINE

## 2023-07-05 PROCEDURE — 99213 OFFICE O/P EST LOW 20 MIN: CPT

## 2023-07-05 RX ORDER — PREGABALIN 75 MG/1
75 CAPSULE ORAL 2 TIMES DAILY
Qty: 180 CAPSULE | Refills: 0 | Status: SHIPPED | OUTPATIENT
Start: 2023-07-05 | End: 2023-10-03

## 2023-10-13 ENCOUNTER — OFFICE VISIT (OUTPATIENT)
Dept: PAIN MANAGEMENT | Age: 38
End: 2023-10-13
Payer: COMMERCIAL

## 2023-10-13 VITALS
HEIGHT: 60 IN | SYSTOLIC BLOOD PRESSURE: 135 MMHG | OXYGEN SATURATION: 96 % | DIASTOLIC BLOOD PRESSURE: 74 MMHG | RESPIRATION RATE: 16 BRPM | HEART RATE: 73 BPM | TEMPERATURE: 97.8 F | BODY MASS INDEX: 33.38 KG/M2 | WEIGHT: 170 LBS

## 2023-10-13 DIAGNOSIS — S83.91XA SPRAIN OF RIGHT KNEE, UNSPECIFIED LIGAMENT, INITIAL ENCOUNTER: ICD-10-CM

## 2023-10-13 DIAGNOSIS — S83.511A SPRAIN OF ANTERIOR CRUCIATE LIGAMENT OF RIGHT KNEE, INITIAL ENCOUNTER: ICD-10-CM

## 2023-10-13 DIAGNOSIS — G89.4 CHRONIC PAIN SYNDROME: Primary | ICD-10-CM

## 2023-10-13 DIAGNOSIS — S82.124A CLOSED NONDISPLACED FRACTURE OF LATERAL CONDYLE OF RIGHT TIBIA, INITIAL ENCOUNTER: ICD-10-CM

## 2023-10-13 PROCEDURE — 99213 OFFICE O/P EST LOW 20 MIN: CPT | Performed by: PAIN MEDICINE

## 2023-10-13 RX ORDER — GABAPENTIN 100 MG/1
100 CAPSULE ORAL 3 TIMES DAILY
Qty: 90 CAPSULE | Refills: 2 | Status: SHIPPED | OUTPATIENT
Start: 2023-10-13 | End: 2024-01-11

## 2023-10-13 NOTE — PROGRESS NOTES
[facility]        [address]  [address], [place]  Dept: [phone]        Follow up Note      Irene Morales     Date of Visit:  10/13/23     CC:  Patient presents for follow up   Chief Complaint   Patient presents with    Follow-up     Right knee pain      HPI:    Pain is unchanged. Change in quality of symptoms:no. Medication side effects:fatigue and lack of focus with pregabalin. Recent diagnostic testing:none. Recent interventional procedures:none. She has been on anticoagulation medications to include NSAIDS and has not been on herbal supplements. She is not diabetic. Imagin/2020 MRI rt knee -  Findings: There is posttraumatic bone marrow edema which is most pronounced at   the lateral tibial plateau but also has present at the lateral femoral   condyle and minimally at the medial femoral condyle and medial tibial   plateau posteriorly. Additionally, T1-weighted images at the lateral   tibial plateau demonstrate trabecular disruption compatible with an   underlying nondisplaced fracture. There is a tear of the ACL at the   femoral insertion. The PCL is intact. The medial and lateral   collateral ligaments are intact. The quadriceps and patellar tendons   are intact. The articular cartilages appear relatively preserved. There is no evidence of a meniscal tear. There is a small joint   effusion. Impression   1. Nondisplaced fracture of the lateral tibial plateau. Posttraumatic   bone marrow edema at the medial and lateral femoral condyles and   minimally at the posterior aspect of the medial tibial plateau. 2. ACL tear at the femoral insertion. 3. Small joint effusion. 2020 xray rt knee -  Findings:        Multiple views demonstrate no fracture or dislocation. Joint spaces   are maintained. No joint effusion evident.            Impression       Unremarkable right knee          Potential Aberrant Drug-Related

## 2023-11-16 ENCOUNTER — OFFICE VISIT (OUTPATIENT)
Dept: PAIN MANAGEMENT | Age: 38
End: 2023-11-16
Payer: COMMERCIAL

## 2023-11-16 VITALS
HEIGHT: 60 IN | HEART RATE: 58 BPM | OXYGEN SATURATION: 96 % | RESPIRATION RATE: 16 BRPM | TEMPERATURE: 97.7 F | DIASTOLIC BLOOD PRESSURE: 64 MMHG | BODY MASS INDEX: 33.38 KG/M2 | SYSTOLIC BLOOD PRESSURE: 96 MMHG | WEIGHT: 170 LBS

## 2023-11-16 DIAGNOSIS — S83.91XA SPRAIN OF RIGHT KNEE, UNSPECIFIED LIGAMENT, INITIAL ENCOUNTER: ICD-10-CM

## 2023-11-16 DIAGNOSIS — S82.124A CLOSED NONDISPLACED FRACTURE OF LATERAL CONDYLE OF RIGHT TIBIA, INITIAL ENCOUNTER: ICD-10-CM

## 2023-11-16 DIAGNOSIS — S83.511A SPRAIN OF ANTERIOR CRUCIATE LIGAMENT OF RIGHT KNEE, INITIAL ENCOUNTER: ICD-10-CM

## 2023-11-16 PROCEDURE — 99213 OFFICE O/P EST LOW 20 MIN: CPT

## 2023-11-16 RX ORDER — GABAPENTIN 300 MG/1
CAPSULE ORAL
Qty: 81 CAPSULE | Refills: 0 | Status: SHIPPED | OUTPATIENT
Start: 2023-11-16 | End: 2023-12-16

## 2023-11-16 NOTE — PROGRESS NOTES
[facility]        [address]  Dept: [phone]        Follow up Note      Gerson Easton     Date of Visit:  23     CC:  Patient presents for follow up   Chief Complaint   Patient presents with    Follow-up    Leg Pain     Right leg and knee     HPI:    Pain is unchanged. Change in quality of symptoms:no. Medication side effects:fatigue and lack of focus with pregabalin. Recent diagnostic testing:none. Recent interventional procedures:none. She has been on anticoagulation medications to include NSAIDS and has not been on herbal supplements. She is not diabetic. Imagin/2020 MRI rt knee -  Findings: There is posttraumatic bone marrow edema which is most pronounced at   the lateral tibial plateau but also has present at the lateral femoral   condyle and minimally at the medial femoral condyle and medial tibial   plateau posteriorly. Additionally, T1-weighted images at the lateral   tibial plateau demonstrate trabecular disruption compatible with an   underlying nondisplaced fracture. There is a tear of the ACL at the   femoral insertion. The PCL is intact. The medial and lateral   collateral ligaments are intact. The quadriceps and patellar tendons   are intact. The articular cartilages appear relatively preserved. There is no evidence of a meniscal tear. There is a small joint   effusion. Impression   1. Nondisplaced fracture of the lateral tibial plateau. Posttraumatic   bone marrow edema at the medial and lateral femoral condyles and   minimally at the posterior aspect of the medial tibial plateau. 2. ACL tear at the femoral insertion. 3. Small joint effusion. 2020 xray rt knee -  Findings:        Multiple views demonstrate no fracture or dislocation. Joint spaces   are maintained. No joint effusion evident.            Impression       Unremarkable right knee          Potential Aberrant

## 2024-02-14 ENCOUNTER — OFFICE VISIT (OUTPATIENT)
Dept: FAMILY MEDICINE CLINIC | Age: 39
End: 2024-02-14
Payer: COMMERCIAL

## 2024-02-14 VITALS
TEMPERATURE: 97.4 F | SYSTOLIC BLOOD PRESSURE: 120 MMHG | WEIGHT: 178 LBS | RESPIRATION RATE: 18 BRPM | BODY MASS INDEX: 34.76 KG/M2 | HEART RATE: 92 BPM | OXYGEN SATURATION: 93 % | DIASTOLIC BLOOD PRESSURE: 72 MMHG

## 2024-02-14 DIAGNOSIS — U07.1 COVID: ICD-10-CM

## 2024-02-14 DIAGNOSIS — R68.89 FLU-LIKE SYMPTOMS: Primary | ICD-10-CM

## 2024-02-14 LAB
INFLUENZA A ANTIBODY: NORMAL
INFLUENZA B ANTIBODY: NORMAL
Lab: ABNORMAL
PERFORMING INSTRUMENT: ABNORMAL
QC PASS/FAIL: ABNORMAL
SARS-COV-2, POC: DETECTED

## 2024-02-14 PROCEDURE — 87804 INFLUENZA ASSAY W/OPTIC: CPT | Performed by: PHYSICIAN ASSISTANT

## 2024-02-14 PROCEDURE — 99203 OFFICE O/P NEW LOW 30 MIN: CPT | Performed by: PHYSICIAN ASSISTANT

## 2024-02-14 PROCEDURE — 87426 SARSCOV CORONAVIRUS AG IA: CPT | Performed by: PHYSICIAN ASSISTANT

## 2024-02-14 NOTE — PROGRESS NOTES
Influenza A/B  -     POCT COVID-19, Antigen    COVID        The patient is to call for any concerns or return if any of the signs or symptoms worsen. The patient is to follow-up with PCP in the next 2-3 days for repeat evaluation repeat assessment or go directly to the emergency department.     SIGNATURE: Hali Be PA-C

## 2024-04-04 ENCOUNTER — OFFICE VISIT (OUTPATIENT)
Dept: PAIN MANAGEMENT | Age: 39
End: 2024-04-04
Payer: COMMERCIAL

## 2024-04-04 VITALS
BODY MASS INDEX: 34.93 KG/M2 | RESPIRATION RATE: 16 BRPM | SYSTOLIC BLOOD PRESSURE: 123 MMHG | OXYGEN SATURATION: 97 % | HEART RATE: 67 BPM | HEIGHT: 60 IN | TEMPERATURE: 97.5 F | DIASTOLIC BLOOD PRESSURE: 87 MMHG | WEIGHT: 177.91 LBS

## 2024-04-04 DIAGNOSIS — G89.4 CHRONIC PAIN SYNDROME: ICD-10-CM

## 2024-04-04 DIAGNOSIS — S83.91XA SPRAIN OF RIGHT KNEE, UNSPECIFIED LIGAMENT, INITIAL ENCOUNTER: Primary | ICD-10-CM

## 2024-04-04 DIAGNOSIS — S82.124A CLOSED NONDISPLACED FRACTURE OF LATERAL CONDYLE OF RIGHT TIBIA, INITIAL ENCOUNTER: ICD-10-CM

## 2024-04-04 DIAGNOSIS — S83.511A SPRAIN OF ANTERIOR CRUCIATE LIGAMENT OF RIGHT KNEE, INITIAL ENCOUNTER: ICD-10-CM

## 2024-04-04 PROCEDURE — 99213 OFFICE O/P EST LOW 20 MIN: CPT

## 2024-04-04 RX ORDER — ALBUTEROL SULFATE 90 UG/1
AEROSOL, METERED RESPIRATORY (INHALATION)
COMMUNITY
Start: 2024-02-20

## 2024-04-04 RX ORDER — PREGABALIN 50 MG/1
50 CAPSULE ORAL 3 TIMES DAILY
Qty: 90 CAPSULE | Refills: 2 | Status: SHIPPED | OUTPATIENT
Start: 2024-04-04 | End: 2024-07-03

## 2024-04-04 NOTE — PROGRESS NOTES
Mary Anne Richards presents to the [facility] on 4/4/2024. Mary Anne is complaining of pain right knee radiating to shin. The pain is constant. The pain is described as sharp, pinching, tingling. Pain is rated on her best day at a 3, on her worst day at a 10, and on average at a 5 on the VAS scale. She took her last dose of Motrin PRN.     Any procedures since your last visit: No.    Pacemaker or defibrillator: No.    She is occasionally on NSAIDS and is not on anticoagulation medications to include none.     Medication Contract and Consent for Opioid Use Documents Filed        No documents found                    /87   Pulse 67   Temp 97.5 °F (36.4 °C) (Infrared)   Resp 16   Ht 1.524 m (5')   Wt 80.7 kg (177 lb 14.6 oz)   SpO2 97%   BMI 34.75 kg/m²      No LMP recorded. (Menstrual status: Other - See Notes).  
full  Right full     Neurological:     Sensory:normal to light touch BLE escept some decreased sensation in the post-surgical region  Motor:                Right Quadriceps5/5          Left Quadriceps5/5           Right Gastrocnemius5/5    Left Gastrocnemius5/5  Right Ant Tibialis5/5  Left Ant Tibialis5/5     Reflexes:  (not assessed today)  Right Quadriceps reflex2+  Left Quadriceps reflex2+  Right Achilles reflex2+  Left Achilles reflex2+  Gait:normal station     Dermatology:     Skin:no rashes or lesions noted     Impression:     Chronic rt knee pain s/p rt knee work-related injury DOI 6/18/2020 where she was wrapping a pallet with lateral force and incurred the knee injury  Diagnosed with rt ACL tear at the femoral insertion as well as nondisplaced fracture of lateral tibial plateau and treated surgically with Dr. Hdz 12/20/2020  + N/T of the anterior lower leg, + weakness at times and also at times locks up  Consistent with CRPS  PMHx: none  Has not worked since the injury, was working [employer]    Gabapentin does not give SE's but is not significantly helping pain at this time, will increase      Plan:     Had second opinion with Dr. Hand  Urine screen reviewed  [facility] report reviewed  failed pregabalin due to fatigue  Retry pregabalin but at 50 mg titration as fco up to TID  failed gabapentin 300 mg TID  Completed PT - continue with HEP  Patient encouraged to stay active and to lose weight  Treatment plan discussed with the patient including medication and procedure side effects    Cc:  Referring physician    MARIO Saleem D.O.